# Patient Record
Sex: MALE | Race: WHITE | Employment: FULL TIME | URBAN - NONMETROPOLITAN AREA
[De-identification: names, ages, dates, MRNs, and addresses within clinical notes are randomized per-mention and may not be internally consistent; named-entity substitution may affect disease eponyms.]

---

## 2019-05-05 ENCOUNTER — APPOINTMENT (OUTPATIENT)
Dept: ULTRASOUND IMAGING | Age: 69
End: 2019-05-05
Payer: MEDICARE

## 2019-05-05 ENCOUNTER — APPOINTMENT (OUTPATIENT)
Dept: CT IMAGING | Age: 69
End: 2019-05-05
Payer: MEDICARE

## 2019-05-05 ENCOUNTER — HOSPITAL ENCOUNTER (EMERGENCY)
Age: 69
Discharge: HOME | End: 2019-05-05
Attending: FAMILY MEDICINE
Payer: MEDICARE

## 2019-05-05 VITALS
WEIGHT: 185 LBS | OXYGEN SATURATION: 100 % | TEMPERATURE: 98 F | HEART RATE: 59 BPM | RESPIRATION RATE: 15 BRPM | BODY MASS INDEX: 23.74 KG/M2 | SYSTOLIC BLOOD PRESSURE: 134 MMHG | DIASTOLIC BLOOD PRESSURE: 74 MMHG | HEIGHT: 74 IN

## 2019-05-05 DIAGNOSIS — S39.94XA SCROTAL INJURY, INITIAL ENCOUNTER: Primary | ICD-10-CM

## 2019-05-05 DIAGNOSIS — S31.119A LACERATION OF GROIN, INITIAL ENCOUNTER: ICD-10-CM

## 2019-05-05 DIAGNOSIS — S30.22XA CONTUSION OF SCROTUM, INITIAL ENCOUNTER: ICD-10-CM

## 2019-05-05 LAB
ALBUMIN SERPL-MCNC: 4 G/DL (ref 3.5–5.1)
ALP BLD-CCNC: 68 U/L (ref 38–126)
ALT SERPL-CCNC: 20 U/L (ref 11–66)
ANION GAP SERPL CALCULATED.3IONS-SCNC: 13 MEQ/L (ref 8–16)
AST SERPL-CCNC: 27 U/L (ref 5–40)
BASOPHILS # BLD: 0.4 %
BASOPHILS ABSOLUTE: 0 THOU/MM3 (ref 0–0.1)
BILIRUB SERPL-MCNC: 0.5 MG/DL (ref 0.3–1.2)
BILIRUBIN URINE: NEGATIVE
BLOOD, URINE: NEGATIVE
BUN BLDV-MCNC: 24 MG/DL (ref 7–22)
CALCIUM SERPL-MCNC: 9.2 MG/DL (ref 8.5–10.5)
CHARACTER, URINE: CLEAR
CHLORIDE BLD-SCNC: 106 MEQ/L (ref 98–111)
CO2: 23 MEQ/L (ref 23–33)
COLOR: YELLOW
CREAT SERPL-MCNC: 0.9 MG/DL (ref 0.4–1.2)
EOSINOPHIL # BLD: 2.9 %
EOSINOPHILS ABSOLUTE: 0.3 THOU/MM3 (ref 0–0.4)
ERYTHROCYTE [DISTWIDTH] IN BLOOD BY AUTOMATED COUNT: 13.1 % (ref 11.5–14.5)
ERYTHROCYTE [DISTWIDTH] IN BLOOD BY AUTOMATED COUNT: 42.7 FL (ref 35–45)
GFR SERPL CREATININE-BSD FRML MDRD: 83 ML/MIN/1.73M2
GLUCOSE BLD-MCNC: 84 MG/DL (ref 70–108)
GLUCOSE URINE: NEGATIVE MG/DL
HCT VFR BLD CALC: 42.5 % (ref 42–52)
HEMOGLOBIN: 14.4 GM/DL (ref 14–18)
IMMATURE GRANS (ABS): 0.02 THOU/MM3 (ref 0–0.07)
IMMATURE GRANULOCYTES: 0.2 %
KETONES, URINE: 15
LEUKOCYTE ESTERASE, URINE: NEGATIVE
LYMPHOCYTES # BLD: 21 %
LYMPHOCYTES ABSOLUTE: 1.9 THOU/MM3 (ref 1–4.8)
MCH RBC QN AUTO: 30.3 PG (ref 26–33)
MCHC RBC AUTO-ENTMCNC: 33.9 GM/DL (ref 32.2–35.5)
MCV RBC AUTO: 89.5 FL (ref 80–94)
MONOCYTES # BLD: 6.3 %
MONOCYTES ABSOLUTE: 0.6 THOU/MM3 (ref 0.4–1.3)
NITRITE, URINE: NEGATIVE
NUCLEATED RED BLOOD CELLS: 0 /100 WBC
OSMOLALITY CALCULATION: 286.4 MOSMOL/KG (ref 275–300)
PH UA: 5.5 (ref 5–9)
PLATELET # BLD: 200 THOU/MM3 (ref 130–400)
PMV BLD AUTO: 10 FL (ref 9.4–12.4)
POTASSIUM REFLEX MAGNESIUM: 3.9 MEQ/L (ref 3.5–5.2)
PROTEIN UA: NEGATIVE
RBC # BLD: 4.75 MILL/MM3 (ref 4.7–6.1)
SEG NEUTROPHILS: 69.2 %
SEGMENTED NEUTROPHILS ABSOLUTE COUNT: 6.4 THOU/MM3 (ref 1.8–7.7)
SODIUM BLD-SCNC: 142 MEQ/L (ref 135–145)
SPECIFIC GRAVITY, URINE: 1.03 (ref 1–1.03)
TOTAL PROTEIN: 6.9 G/DL (ref 6.1–8)
UROBILINOGEN, URINE: 0.2 EU/DL (ref 0–1)
WBC # BLD: 9.2 THOU/MM3 (ref 4.8–10.8)

## 2019-05-05 PROCEDURE — 74177 CT ABD & PELVIS W/CONTRAST: CPT

## 2019-05-05 PROCEDURE — 96365 THER/PROPH/DIAG IV INF INIT: CPT

## 2019-05-05 PROCEDURE — 81003 URINALYSIS AUTO W/O SCOPE: CPT

## 2019-05-05 PROCEDURE — 90715 TDAP VACCINE 7 YRS/> IM: CPT | Performed by: FAMILY MEDICINE

## 2019-05-05 PROCEDURE — 76870 US EXAM SCROTUM: CPT

## 2019-05-05 PROCEDURE — 99284 EMERGENCY DEPT VISIT MOD MDM: CPT

## 2019-05-05 PROCEDURE — 80053 COMPREHEN METABOLIC PANEL: CPT

## 2019-05-05 PROCEDURE — 36415 COLL VENOUS BLD VENIPUNCTURE: CPT

## 2019-05-05 PROCEDURE — 6360000002 HC RX W HCPCS: Performed by: FAMILY MEDICINE

## 2019-05-05 PROCEDURE — 12042 INTMD RPR N-HF/GENIT2.6-7.5: CPT

## 2019-05-05 PROCEDURE — 6360000004 HC RX CONTRAST MEDICATION: Performed by: FAMILY MEDICINE

## 2019-05-05 PROCEDURE — 85025 COMPLETE CBC W/AUTO DIFF WBC: CPT

## 2019-05-05 PROCEDURE — 90471 IMMUNIZATION ADMIN: CPT | Performed by: FAMILY MEDICINE

## 2019-05-05 RX ORDER — SULFAMETHOXAZOLE AND TRIMETHOPRIM 800; 160 MG/1; MG/1
1 TABLET ORAL 2 TIMES DAILY
Qty: 20 TABLET | Refills: 0 | Status: SHIPPED | OUTPATIENT
Start: 2019-05-05 | End: 2019-05-15

## 2019-05-05 RX ORDER — DOXYCYCLINE HYCLATE 100 MG
100 TABLET ORAL 2 TIMES DAILY
Qty: 20 TABLET | Refills: 0 | Status: SHIPPED | OUTPATIENT
Start: 2019-05-05 | End: 2019-05-15

## 2019-05-05 RX ORDER — NAPROXEN 500 MG/1
500 TABLET ORAL 2 TIMES DAILY
Qty: 60 TABLET | Refills: 0 | Status: SHIPPED | OUTPATIENT
Start: 2019-05-05 | End: 2021-07-17

## 2019-05-05 RX ADMIN — IOPAMIDOL 80 ML: 755 INJECTION, SOLUTION INTRAVENOUS at 19:59

## 2019-05-05 RX ADMIN — Medication 2 G: at 18:31

## 2019-05-05 RX ADMIN — TETANUS TOXOID, REDUCED DIPHTHERIA TOXOID AND ACELLULAR PERTUSSIS VACCINE, ADSORBED 0.5 ML: 5; 2.5; 8; 8; 2.5 SUSPENSION INTRAMUSCULAR at 18:33

## 2019-05-05 ASSESSMENT — ENCOUNTER SYMPTOMS
COUGH: 0
RHINORRHEA: 0
NAUSEA: 0
SORE THROAT: 0
ABDOMINAL PAIN: 0
BACK PAIN: 0
VOMITING: 0
WHEEZING: 0
SHORTNESS OF BREATH: 0
EYE DISCHARGE: 0
EYE REDNESS: 0
DIARRHEA: 0

## 2019-05-05 ASSESSMENT — PAIN DESCRIPTION - PAIN TYPE: TYPE: ACUTE PAIN

## 2019-05-05 ASSESSMENT — PAIN DESCRIPTION - DESCRIPTORS: DESCRIPTORS: TENDER

## 2019-05-05 ASSESSMENT — PAIN DESCRIPTION - LOCATION: LOCATION: SCROTUM

## 2019-05-05 ASSESSMENT — PAIN SCALES - GENERAL: PAINLEVEL_OUTOF10: 7

## 2019-05-05 NOTE — ED PROVIDER NOTES
supple. No JVD present. Cardiovascular: Normal rate and regular rhythm. Pulmonary/Chest: Effort normal. No stridor. No respiratory distress. Abrasion to to right chest wall   Abdominal: Soft. He exhibits no distension. Genitourinary: Right testis shows no tenderness. Genitourinary Comments: Pain and tenderness to scrotum with complex hematoma and laceration. There is also a laceration to the right inguinal canal.    Musculoskeletal: Normal range of motion. He exhibits no edema. Neurological: He is alert and oriented to person, place, and time. He exhibits normal muscle tone. GCS eye subscore is 4. GCS verbal subscore is 5. GCS motor subscore is 6. Skin: Skin is warm and dry. He is not diaphoretic. No erythema. Psychiatric: He has a normal mood and affect. His behavior is normal.   Nursing note and vitals reviewed. DIFFERENTIAL DIAGNOSIS:       DIAGNOSTIC RESULTS     EKG: All EKG's are interpreted by the Emergency Department Physician who either signs or Co-signs this chart in the absence of acardiologist.    RADIOLOGY: non-plain film images(s) such as CT, Ultrasound and MRI are read by the radiologist.    CT ABDOMEN PELVIS W IV CONTRAST Additional Contrast? None   Final Result   1. The scrotal region is incompletely visualized. However, there is soft tissue gas attenuation demonstrated within the soft tissues of the right inguinal region anterior to the rightward pubic region was occluded along the dorsal aspect of the right    inguinal canal. There is soft tissue stranding opacity demonstrated in this region which may represent mild hematoma. Please refer to scrotal ultrasound examination reported separately for further details of scrotal findings. 2. Indeterminate subcentimeter pulmonary nodules within the lung bases. In the absence of a known primary malignancy, recommend six-month follow-up CT chest to document stability. 3. Enlarged heterogeneous appearing prostate gland. Correlate clinically with PSA level. **This report has been created using voice recognition software. It may contain minor errors which are inherent in voice recognition technology. **      Final report electronically signed by Dr. Bishop Ruth on 5/5/2019 9:10 PM      [address]   Final Result   1. There is thickening of the scrotal wall predominantly on the right measuring up to 1.2 cm in thickness. This may represent soft tissue edema or hematoma given patient's history of scrotal injury. There are small echogenic foci demonstrated within the    right scrotal wall soft tissues which are favored represent scattered foci of soft tissue gas. Given patient's history of recent trauma, this likely represents sequela from laceration. 2. The testicles appear grossly intact. There is no sonographic evidence of testicular torsion. 3. Small left-sided hydrocele with mild internal echoes is consistent with a small mildly complex left-sided hydrocele. **This report has been created using voice recognition software. It may contain minor errors which are inherent in voice recognition technology. **      Final report electronically signed by Dr. Bishop Ruth on 5/5/2019 8:25 PM          LABS:   Labs Reviewed   COMPREHENSIVE METABOLIC PANEL W/ REFLEX TO MG FOR LOW K - Abnormal; Notable for the following components:       Result Value    BUN 24 (*)     All other components within normal limits   URINE RT REFLEX TO CULTURE - Abnormal; Notable for the following components:    Ketones, Urine 15 (*)     All other components within normal limits   GLOMERULAR FILTRATION RATE, ESTIMATED - Abnormal; Notable for the following components:    Est, Glom Filt Rate 83 (*)     All other components within normal limits   CBC WITH AUTO DIFFERENTIAL   ANION GAP   OSMOLALITY       EMERGENCY DEPARTMENT COURSE:   Vitals:    Vitals:    05/05/19 1802 05/05/19 1804 05/05/19 1843 05/05/19 1947   BP:  (!) 165/79 136/78 immediate complications      [id number] phone conversation with his wife about the stitches . I did inform them they are absorbable and will resorb on their own. He can shower and then apply triple antibiotic cream. Take his antibiotics and follow up with urologist tomorrow. FINAL IMPRESSION      1. Scrotal injury, initial encounter    2. Laceration of groin, initial encounter    3. Contusion of scrotum, initial encounter          DISPOSITION/PLAN     Patient is discharged. PATIENT REFERRED TO:  Hannah Navarro MD  [address] [phone]    Schedule an appointment as soon as possible for a visit in 1 day      [facility] [address] [address] EMERGENCY DEPT  [address]  [address] [place] [address]  [phone]    If symptoms worsen at any time      DISCHARGE MEDICATIONS:  Discharge Medication List as of 5/5/2019  8:23 PM      START taking these medications    Details   doxycycline hyclate (VIBRA-TABS) 100 MG tablet Take 1 tablet by mouth 2 times daily for 10 days, Disp-20 tablet, R-0Print      sulfamethoxazole-trimethoprim (BACTRIM DS) 800-160 MG per tablet Take 1 tablet by mouth 2 times daily for 10 days, Disp-20 tablet, R-0Print      naproxen (NAPROSYN) 500 MG tablet Take 1 tablet by mouth 2 times daily, Disp-60 tablet, R-0Print             (Please note that portions of this note were completed with a voice recognition program.Efforts were made to edit the dictations but occasionally words are mis-transcribed.)    Scribe:  Signed by: Nava Sin, 5/5/196:09 PM Scribing for and in the presence of Omid Ray MD    Provider:  I personally performed the services described in the documentation, reviewed and edited the documentation which was dictated to the scribe in mypresence, and it accurately records my words and actions.     Omid Ray MD 5/5/19 5:08 PM                      Omid Ray MD  05/05/19 [id number]       Omid Ray MD  05/05/19 7122       Omid Ray MD  05/06/19

## 2019-05-05 NOTE — ED TRIAGE NOTES
Pt presents to the ED from urgent care following a fall. Pt reports he was on a ladder in his barn when he fell on a skidloader from 12 feet, and when he came down, the forks on skidloader puncutured his scrotum. Pt denies wanting anything for pain, pt reports \"I just want to make sure my scrotum is fine and I want to go home. \" Pts wife at bedside. Pt alert and oriented x4.

## 2019-05-09 ENCOUNTER — OFFICE VISIT (OUTPATIENT)
Dept: UROLOGY | Age: 69
End: 2019-05-09
Payer: MEDICARE

## 2019-05-09 VITALS
SYSTOLIC BLOOD PRESSURE: 136 MMHG | BODY MASS INDEX: 24.64 KG/M2 | WEIGHT: 192 LBS | DIASTOLIC BLOOD PRESSURE: 78 MMHG | HEIGHT: 74 IN

## 2019-05-09 DIAGNOSIS — S39.94XA INJURY TO SCROTUM, INITIAL ENCOUNTER: Primary | ICD-10-CM

## 2019-05-09 LAB
BILIRUBIN URINE: NEGATIVE
BLOOD URINE, POC: NEGATIVE
CHARACTER, URINE: CLEAR
COLOR, URINE: YELLOW
GLUCOSE URINE: NEGATIVE MG/DL
KETONES, URINE: NEGATIVE
LEUKOCYTE CLUMPS, URINE: NEGATIVE
NITRITE, URINE: NEGATIVE
PH, URINE: 5.5 (ref 5–9)
PROTEIN, URINE: NEGATIVE MG/DL
SPECIFIC GRAVITY, URINE: 1.02 (ref 1–1.03)
UROBILINOGEN, URINE: 0.2 EU/DL (ref 0–1)

## 2019-05-09 PROCEDURE — 81003 URINALYSIS AUTO W/O SCOPE: CPT | Performed by: UROLOGY

## 2019-05-09 PROCEDURE — 99202 OFFICE O/P NEW SF 15 MIN: CPT | Performed by: UROLOGY

## 2019-05-09 ASSESSMENT — ENCOUNTER SYMPTOMS
CHEST TIGHTNESS: 0
EYE PAIN: 0
COLOR CHANGE: 0
SHORTNESS OF BREATH: 0
EYE REDNESS: 0
FACIAL SWELLING: 0

## 2019-05-09 NOTE — PROGRESS NOTES
[address] [place] [address] Rd.  [address] [facility]  Dept: [phone]  Loc: [phone]  Visit Date: 5/9/2019    Subjective:      Patient ID: Sho Charles 70 y.o. male 1950    Chief Complaint   Patient presents with    Advice Only     New Pateint/ Scrotal injury        HPI 39-year-old white male who sustained blunt and penetrating trauma of the scrotum and right medial thigh 4 days ago. Fortunately for him he did not sustain any major or life-threatening injuries. He was seen in the emergency department he was evaluated and treated. A CT scan of the abdomen and pelvis as well as a scrotal ultrasound were obtained. I have reviewed these 2 studies. Urologically the CT scan shows an enlarged prostate, somewhat heterogeneous. The patient is very healthy. He has never had any urologic problems. He has never had any surgery except for tonsillectomy as a child. He takes no prescription medications. History reviewed. No pertinent past medical history. Social History     Socioeconomic History    Marital status:      Spouse name: Not on file    Number of children: Not on file    Years of education: Not on file    Highest education level: Not on file   Occupational History    Not on file   Social Needs    Financial resource strain: Not on file    Food insecurity:     Worry: Not on file     Inability: Not on file    Transportation needs:     Medical: Not on file     Non-medical: Not on file   Tobacco Use    Smoking status: Never Smoker    Smokeless tobacco: Never Used   Substance and Sexual Activity    Alcohol use:  Yes     Alcohol/week: 1.8 oz     Types: 3 Glasses of wine per week     Comment: Wine    Drug use: No    Sexual activity: Not on file   Lifestyle    Physical activity:     Days per week: Not on file     Minutes per session: Not on file    Stress: Not on file   Relationships    Social connections:     Talks on well-developed and well-nourished. HENT:   Head: Normocephalic and atraumatic. Eyes: Pupils are equal, round, and reactive to light. EOM are normal.   Abdominal: Soft. Bowel sounds are normal.   Genitourinary: Rectum normal, prostate normal, testes normal and penis normal.         Neurological: He is alert and oriented to person, place, and time. Skin: Skin is warm and dry. Psychiatric: He has a normal mood and affect. Vitals reviewed. Assessment:       Diagnosis Orders   1. Injury to scrotum, initial encounter  POCT Urinalysis No Micro (Auto)       Mr. Bruno Mojica today in consultation for Injury to scrotum, initial encounter [S39.94XA]. Despite sustaining blunt and penetrating trauma of the scrotum and right medial thigh, the patient has avoided serious/life-threatening injuries. He has no baseline urologic problems. He refused a PSA today. Plan:        I will see him on a when necessary basis.

## 2019-05-23 ENCOUNTER — TELEPHONE (OUTPATIENT)
Dept: UROLOGY | Age: 69
End: 2019-05-23

## 2019-05-23 NOTE — TELEPHONE ENCOUNTER
I called the patient to see if I could give a message to you from the patient. He would rather you call him. Please advise. Thank you.

## 2019-05-23 NOTE — TELEPHONE ENCOUNTER
Patient is calling stating he would like to have Dr. Smita Hernandez call him as he has some questions about his surgery.  Please advise at [phone]

## 2019-05-24 ENCOUNTER — TELEPHONE (OUTPATIENT)
Dept: UROLOGY | Age: 69
End: 2019-05-24

## 2019-05-24 NOTE — TELEPHONE ENCOUNTER
The patient called stating he had crusty material around his umbilicus. There is also seepage noted. No prior history of this. It is drying up so to speak. CT scan was reviewed, looking at the.  Umbilical region. I do not see a patent urachus.   I have a call in to the radiologist (Dr. Haven Cornelius) so he can review the films with me and see if there is anything subtle in this area

## 2019-05-28 ENCOUNTER — TELEPHONE (OUTPATIENT)
Dept: UROLOGY | Age: 69
End: 2019-05-28

## 2019-05-28 NOTE — TELEPHONE ENCOUNTER
The patient would like to have the stitches out. They are starting to irritate h im. They did not dissolve. There is a little hard spot on the stitches that itch. He denies fever or chills. Please advise. Thank you.

## 2019-05-29 ENCOUNTER — OFFICE VISIT (OUTPATIENT)
Dept: UROLOGY | Age: 69
End: 2019-05-29
Payer: MEDICARE

## 2019-05-29 ENCOUNTER — NURSE ONLY (OUTPATIENT)
Dept: LAB | Age: 69
End: 2019-05-29

## 2019-05-29 ENCOUNTER — TELEPHONE (OUTPATIENT)
Dept: UROLOGY | Age: 69
End: 2019-05-29

## 2019-05-29 VITALS
DIASTOLIC BLOOD PRESSURE: 82 MMHG | WEIGHT: 194 LBS | BODY MASS INDEX: 24.9 KG/M2 | SYSTOLIC BLOOD PRESSURE: 132 MMHG | HEIGHT: 74 IN

## 2019-05-29 DIAGNOSIS — Z12.5 SCREENING FOR PROSTATE CANCER: ICD-10-CM

## 2019-05-29 DIAGNOSIS — R97.20 ELEVATED PSA: Primary | ICD-10-CM

## 2019-05-29 DIAGNOSIS — Z12.5 SCREENING FOR PROSTATE CANCER: Primary | ICD-10-CM

## 2019-05-29 LAB — PROSTATE SPECIFIC ANTIGEN: 6.06 NG/ML (ref 0–1)

## 2019-05-29 PROCEDURE — 99212 OFFICE O/P EST SF 10 MIN: CPT | Performed by: UROLOGY

## 2019-05-29 ASSESSMENT — ENCOUNTER SYMPTOMS
BACK PAIN: 0
COLOR CHANGE: 0
EYE REDNESS: 0
CHEST TIGHTNESS: 0
FACIAL SWELLING: 0
NAUSEA: 0
ABDOMINAL PAIN: 0
EYE PAIN: 0
SHORTNESS OF BREATH: 0

## 2019-05-29 NOTE — PROGRESS NOTES
[address] [place]  Dept: [phone]  Loc: [phone]  Visit Date: 5/29/2019    Subjective:      Patient ID: Iveth White 78 y.o. male 1950    Chief Complaint   Patient presents with    Follow-up     scrotal suture removal       HPI 69-year-old white male returns today for follow-up regarding penetrating trauma of the scrotum and left medial thigh. He states he is doing remarkably well. Urologically, he has never had a PSA. He does have BPH with lower urinary tract symptoms. However his quality of life is unaffected by this. History reviewed. No pertinent past medical history. Social History     Socioeconomic History    Marital status:      Spouse name: Not on file    Number of children: Not on file    Years of education: Not on file    Highest education level: Not on file   Occupational History    Not on file   Social Needs    Financial resource strain: Not on file    Food insecurity:     Worry: Not on file     Inability: Not on file    Transportation needs:     Medical: Not on file     Non-medical: Not on file   Tobacco Use    Smoking status: Never Smoker    Smokeless tobacco: Never Used   Substance and Sexual Activity    Alcohol use:  Yes     Alcohol/week: 1.8 oz     Types: 3 Glasses of wine per week     Comment: Wine    Drug use: No    Sexual activity: Not on file   Lifestyle    Physical activity:     Days per week: Not on file     Minutes per session: Not on file    Stress: Not on file   Relationships    Social connections:     Talks on phone: Not on file     Gets together: Not on file     Attends Religion service: Not on file     Active member of club or organization: Not on file     Attends meetings of clubs or organizations: Not on file     Relationship status: Not on file    Intimate partner violence:     Fear of current or ex partner: Not on file     Emotionally abused: Not on file     Physically abused: Not on file     Forced sexual activity: Not on file   Other Topics Concern    Not on file   Social History Narrative    Not on file       History reviewed. No pertinent family history. Past Surgical History:   Procedure Laterality Date    TONSILLECTOMY         No Known Allergies      Current Outpatient Medications:     naproxen (NAPROSYN) 500 MG tablet, Take 1 tablet by mouth 2 times daily, Disp: 60 tablet, Rfl: 0    Review of Systems   Constitutional: Negative for chills and fever. HENT: Negative for ear pain and facial swelling. Eyes: Negative for pain and redness. Respiratory: Negative for chest tightness and shortness of breath. Cardiovascular: Negative for chest pain and leg swelling. Gastrointestinal: Negative for abdominal pain and nausea. Endocrine: Negative for cold intolerance and heat intolerance. Genitourinary: Negative for difficulty urinating and dysuria. Musculoskeletal: Negative for back pain and joint swelling. Skin: Negative for color change and rash. Allergic/Immunologic: Negative for environmental allergies and food allergies. Neurological: Negative for dizziness and headaches. Hematological: Does not bruise/bleed easily. /82   Ht 6' 2\" (1.88 m)   Wt 194 lb (88 kg)   BMI 24.91 kg/m²     Objective:   Physical Exam   Abdominal: Hernia confirmed negative in the right inguinal area and confirmed negative in the left inguinal area. Genitourinary: Testes normal and penis normal. Circumcised. Lymphadenopathy: No inguinal adenopathy noted on the right or left side. Assessment:       Diagnosis Orders   1. Screening for prostate cancer  PSA Screening       Mr. Astudillopresents today in follow-up for Screening for prostate cancer [Z12.5]. Patient does not want medications at this time. However he does agree to have PSA screening. His CT scan, which I reviewed with the patient shows an enlarged prostate. Plan:        Screening PSA today. Patient will monitor his voiding symptoms and if worsens he will contact me.

## 2019-05-29 NOTE — TELEPHONE ENCOUNTER
The patient stated he already had the lab drawn. I advised him the office would call him with results.

## 2019-05-29 NOTE — TELEPHONE ENCOUNTER
I have spoken with the chemistry lab. An order has been sent for a total and free PSA. We will contact the patient after those results are back.

## 2019-05-31 LAB — PROSTATE SPECIFIC ANTIGEN FREE: NORMAL

## 2019-06-10 ENCOUNTER — TELEPHONE (OUTPATIENT)
Dept: UROLOGY | Age: 69
End: 2019-06-10

## 2019-06-10 NOTE — TELEPHONE ENCOUNTER
Let him know the total psa and free psa are both borderline. I think he should do the exosome DX urine test next. Tell him it looks at 3 genetic markers to give us a probability of high risk cancer in the prostate.

## 2019-06-10 NOTE — TELEPHONE ENCOUNTER
Attempted to contact patient, but unable to reach him. Left message for patient to call our office back. Please relay message per Dr. Gema Mayo when patient calls back. Patient will need to be set up for lab/MA visit if agreeable to do Exosome testing. Thank you.

## 2019-06-13 NOTE — TELEPHONE ENCOUNTER
Attempted to call patient, left a voicemail to call the office back to review results. Please relay message per Dr. Lissy Blakely.

## 2019-06-18 ENCOUNTER — TELEPHONE (OUTPATIENT)
Dept: UROLOGY | Age: 69
End: 2019-06-18

## 2019-06-19 ENCOUNTER — TELEPHONE (OUTPATIENT)
Dept: UROLOGY | Age: 69
End: 2019-06-19

## 2021-07-17 ENCOUNTER — HOSPITAL ENCOUNTER (EMERGENCY)
Age: 71
Discharge: HOME | End: 2021-07-17
Payer: MEDICARE

## 2021-07-17 VITALS
DIASTOLIC BLOOD PRESSURE: 77 MMHG | TEMPERATURE: 96.4 F | OXYGEN SATURATION: 97 % | HEART RATE: 43 BPM | BODY MASS INDEX: 24.39 KG/M2 | WEIGHT: 190 LBS | RESPIRATION RATE: 16 BRPM | SYSTOLIC BLOOD PRESSURE: 163 MMHG

## 2021-07-17 DIAGNOSIS — H65.00 ACUTE SEROUS OTITIS MEDIA, RECURRENCE NOT SPECIFIED, UNSPECIFIED LATERALITY: Primary | ICD-10-CM

## 2021-07-17 DIAGNOSIS — H60.391 ACUTE INFECTIVE OTITIS EXTERNA, RIGHT: ICD-10-CM

## 2021-07-17 PROCEDURE — 99213 OFFICE O/P EST LOW 20 MIN: CPT

## 2021-07-17 PROCEDURE — 99213 OFFICE O/P EST LOW 20 MIN: CPT | Performed by: EMERGENCY MEDICINE

## 2021-07-17 RX ORDER — AMOXICILLIN AND CLAVULANATE POTASSIUM 875; 125 MG/1; MG/1
1 TABLET, FILM COATED ORAL 2 TIMES DAILY
Qty: 20 TABLET | Refills: 0 | Status: SHIPPED | OUTPATIENT
Start: 2021-07-17 | End: 2021-07-27

## 2021-07-17 ASSESSMENT — ENCOUNTER SYMPTOMS
SHORTNESS OF BREATH: 0
COLOR CHANGE: 0
COUGH: 0
SINUS PAIN: 0
SINUS PRESSURE: 0
ABDOMINAL PAIN: 0
SORE THROAT: 0

## 2021-07-17 NOTE — ED PROVIDER NOTES
[facility]       CHIEF COMPLAINT       Chief Complaint   Patient presents with    Ear Drainage     Pt has drainage out of right/left ear. Nurses Notes reviewed and I agree except as noted in the HPI. HISTORY OF PRESENT ILLNESS   Lia Mayer is a 66 y.o. male who presents for complaints of drainage from right ear, pain in the right ear, mild drainage from the left ear. Patient states that he was recently traveling to [place] via plane. He was at higher altitude in that area. This is not unusual for him as he has a cottage to this area. Patient states the drainage started on the way back from [place]. Is been present for about 2 days. He has been irrigating his ears and states the drainage has been more runny than thick. Patient states his hearing is intact. Patient has had mastoiditis as a kid. Patient has had what he describes as otitis externa as an adult and he is very cautious not to get water in his ears. Patient denies any mastoid pain or tenderness. No fever. No neck pain. HPI    REVIEW OF SYSTEMS     Review of Systems   Constitutional: Negative for fatigue. HENT: Positive for ear discharge and ear pain (right ear). Negative for sinus pressure, sinus pain and sore throat. Respiratory: Negative for cough and shortness of breath. Gastrointestinal: Negative for abdominal pain. Skin: Negative for color change. Neurological: Negative for dizziness and light-headedness. Psychiatric/Behavioral: Negative for behavioral problems. PAST MEDICAL HISTORY   History reviewed. No pertinent past medical history. SURGICALHISTORY     Patient  has a past surgical history that includes Tonsillectomy. CURRENT MEDICATIONS       Discharge Medication List as of 7/17/2021  8:43 AM          ALLERGIES     Patient is has No Known Allergies.     Patients   Immunization History   Administered Date(s) Administered    Tdap (Boostrix, recurrence not specified, unspecified laterality    2. Acute infective otitis externa, right          DISPOSITION/ PLAN     Patient presents for what appears to be serous otitis externa. Unable to completely visualize the right TM. Erythematous left TM. Will treat with Cortisporin otic drops in addition to Augmentin. Patient is advised to drink plenty of fluids. Medications as directed. Tylenol or ibuprofen as needed for pain. Follow-up with primary care provider or ENT in 3 days if no improvement of symptoms. Return for worsening symptoms.       PATIENT REFERRED TO:  Mortimer Force, MD  [facility] [address]      DISCHARGE MEDICATIONS:  Discharge Medication List as of 7/17/2021  8:43 AM      START taking these medications    Details   amoxicillin-clavulanate (AUGMENTIN) 875-125 MG per tablet Take 1 tablet by mouth 2 times daily for 10 days, Disp-20 tablet, R-0Normal      neomycin-polymyxin-hydrocortisone (CORTISPORIN) 3.5-61392-4 otic solution Place 4 drops into the right ear 3 times daily for 10 days Instill into right Ear, Disp-1 Bottle, R-0Normal             Discharge Medication List as of 7/17/2021  8:43 AM      STOP taking these medications       naproxen (NAPROSYN) 500 MG tablet Comments:   Reason for Stopping:               Discharge Medication List as of 7/17/2021  8:43 AM          PATRIC Dukes CNP    (Please note that portions of this note were completed with a voice recognition program. Efforts were made to edit the dictations but occasionally words are mis-transcribed.)          PATRIC Dukes CNP  07/17/21 [id number]

## 2021-07-17 NOTE — ED NOTES
Pt discharged. Pt verbalized understanding of discharge instructions and scripts. Pt walked out per self in stable condition.      Yesenia Keen, SARIKA  19/10/44 6985

## 2024-11-23 DIAGNOSIS — K13.79 MOUTH PAIN: Primary | ICD-10-CM

## 2024-11-23 RX ORDER — AMOXICILLIN 500 MG/1
500 CAPSULE ORAL 3 TIMES DAILY
Qty: 30 CAPSULE | Refills: 0 | Status: SHIPPED | OUTPATIENT
Start: 2024-11-23 | End: 2024-12-03